# Patient Record
Sex: MALE | Race: WHITE | NOT HISPANIC OR LATINO | Employment: OTHER | URBAN - METROPOLITAN AREA
[De-identification: names, ages, dates, MRNs, and addresses within clinical notes are randomized per-mention and may not be internally consistent; named-entity substitution may affect disease eponyms.]

---

## 2018-06-25 ENCOUNTER — CATARACT CONSULT (OUTPATIENT)
Dept: URBAN - METROPOLITAN AREA CLINIC 39 | Facility: CLINIC | Age: 73
End: 2018-06-25

## 2018-06-25 VITALS
HEART RATE: 51 BPM | RESPIRATION RATE: 18 BRPM | HEIGHT: 60 IN | DIASTOLIC BLOOD PRESSURE: 86 MMHG | SYSTOLIC BLOOD PRESSURE: 141 MMHG

## 2018-06-25 DIAGNOSIS — H35.3131: ICD-10-CM

## 2018-06-25 DIAGNOSIS — H25.811: ICD-10-CM

## 2018-06-25 DIAGNOSIS — H04.123: ICD-10-CM

## 2018-06-25 DIAGNOSIS — H25.812: ICD-10-CM

## 2018-06-25 DIAGNOSIS — H18.51: ICD-10-CM

## 2018-06-25 PROCEDURE — 1036F TOBACCO NON-USER: CPT

## 2018-06-25 PROCEDURE — 92004 COMPRE OPH EXAM NEW PT 1/>: CPT

## 2018-06-25 PROCEDURE — 92136TC INTERFEROMETRY - TECHNICAL COMPONENT

## 2018-06-25 PROCEDURE — G8427 DOCREV CUR MEDS BY ELIG CLIN: HCPCS

## 2018-06-25 PROCEDURE — G8952 PRE-HTN/HTN, NO F/U, NOT GVN: HCPCS

## 2018-06-25 PROCEDURE — 92286 ANT SGM IMG I&R SPECLR MIC: CPT

## 2018-06-25 PROCEDURE — 92134 CPTRZ OPH DX IMG PST SGM RTA: CPT

## 2018-06-25 PROCEDURE — 92025-3 CORNEAL TOPO, REFUSED

## 2018-06-25 PROCEDURE — 4177F TALK PT/CRGVR RE AREDS PREV: CPT

## 2018-06-25 PROCEDURE — 2019F DILATED MACUL EXAM DONE: CPT

## 2018-06-25 ASSESSMENT — VISUAL ACUITY
OD_SC: J4
OS_BAT: 20/70
OS_CC: 20/40
OS_AM: 20/20
OS_SC: J1
OS_CC: J2
OD_CC: 20/20-2
OD_PAM: 20/20
OD_BAT: 20/50
OS_PH: 20/25
OD_CC: J1
OD_SC: 20/25-1

## 2018-06-25 ASSESSMENT — TONOMETRY
OS_IOP_MMHG: 11
OD_IOP_MMHG: 12

## 2020-03-02 ENCOUNTER — CATARACT CONSULT (OUTPATIENT)
Dept: URBAN - METROPOLITAN AREA CLINIC 39 | Facility: CLINIC | Age: 75
End: 2020-03-02

## 2020-03-02 DIAGNOSIS — H18.51: ICD-10-CM

## 2020-03-02 DIAGNOSIS — H04.123: ICD-10-CM

## 2020-03-02 DIAGNOSIS — H25.811: ICD-10-CM

## 2020-03-02 DIAGNOSIS — H25.812: ICD-10-CM

## 2020-03-02 DIAGNOSIS — H35.3131: ICD-10-CM

## 2020-03-02 PROCEDURE — 92015 DETERMINE REFRACTIVE STATE: CPT

## 2020-03-02 PROCEDURE — V2799PMN IMPRIMIS PRED-MOXI-NEPAF 5ML

## 2020-03-02 PROCEDURE — 92286 ANT SGM IMG I&R SPECLR MIC: CPT

## 2020-03-02 PROCEDURE — 92025-2 CORNEAL TOPOGRAPHY, PT

## 2020-03-02 PROCEDURE — 99214 OFFICE O/P EST MOD 30 MIN: CPT

## 2020-03-02 PROCEDURE — 92136TC INTERFEROMETRY - TECHNICAL COMPONENT

## 2020-03-02 PROCEDURE — 92134 CPTRZ OPH DX IMG PST SGM RTA: CPT

## 2020-03-02 ASSESSMENT — VISUAL ACUITY
OD_CC: 20/25
OD_SC: J3
OD_CC: J1
OD_SC: 20/25
OS_CC: 20/30-2
OD_BAT: 20/60
OD_RAM: 20/20
OS_AM: 20/25
OS_BAT: 20/70
OS_SC: J1+
OS_CC: J1+

## 2020-03-02 ASSESSMENT — TONOMETRY
OS_IOP_MMHG: 16
OD_IOP_MMHG: 14

## 2020-12-14 NOTE — PATIENT DISCUSSION
[facility] IOL +20.0 AO60.

## 2020-12-14 NOTE — PATIENT DISCUSSION
D/C Manuel 128 solution and switch to [facility] 128 ointment QID OS only. (use manuel 1218 solution until ointment comes in. ).

## 2020-12-14 NOTE — PATIENT DISCUSSION
Patient has appointment with RETINA doctor tomorrow - question is:  [address] [facility]   If good visual potential, then will plan for DSAEK (partial thickness endothelial corneal transplant) OS.

## 2020-12-14 NOTE — PATIENT DISCUSSION
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 After risks, benefits, and alternatives were discussed, the patient would like to proceed with punctual plugging of bilateral lower puncti.

## 2020-12-14 NOTE — PROCEDURE NOTE: CLINICAL
PROCEDURE NOTE: Punctal Plugs, Silicone #1 Bilateral Lower Lids. Diagnosis: Dry Eye Syndrome. Anesthesia: Topical. Prior to treatment, the risks/benefits/alternatives were discussed. The patient wished to proceed with procedure. Permanent silicone plugs were inserted. Size/location of plugs inserted: 0.5mm. Patient tolerated procedure well. There were no complications. Post procedure instructions given. Wilman Sotelo

## 2021-01-11 NOTE — PATIENT DISCUSSION
Per Retina doctor Dr. Mayela Small [facility]), pt has Ok visual potential OS - ok to proceed with DSAEK OS. R/B/A to transplant discussed with patient and patient elects to proceed.

## 2021-01-11 NOTE — PATIENT DISCUSSION
[facility] IOL +20.0 AO60.

## 2021-01-11 NOTE — PATIENT DISCUSSION
+/- PAV (patient had vitreous loss during outside cataract surgery -- need to be prepared for more vitreous loss during  DSAEK).

## 2021-01-12 NOTE — PATIENT DISCUSSION
Per Retina doctor Dr. Kovacs [facility]), pt has Ok visual potential OS - ok to proceed with DSAEK OS. R/B/A to transplant discussed with patient and patient elects to proceed.

## 2021-01-12 NOTE — PATIENT DISCUSSION
[facility] +20.0 AO60.

## 2021-01-18 NOTE — PATIENT DISCUSSION
[facility] IOL +20.0 AO60.

## 2021-01-18 NOTE — PATIENT DISCUSSION
Per Retina doctor Dr. Sue Dias [facility]), pt has Ok visual potential OS - ok to proceed with DSAEK OS. R/B/A to transplant discussed with patient and patient elects to proceed.

## 2021-02-05 NOTE — PATIENT DISCUSSION
[facility]OL +20.0 AO60.

## 2021-02-05 NOTE — PATIENT DISCUSSION
Per Retina doctor Dr. Moriah Corbett [facility]), pt has Ok visual potential OS - ok to proceed with DSAEK OS. R/B/A to transplant discussed with patient and patient elects to proceed.

## 2021-02-05 NOTE — PATIENT DISCUSSION
Per Retina doctor Dr. Camara Noé [facility]), pt has Ok visual potential OS - ok to proceed with DSAEK OS. R/B/A to transplant discussed with patient and patient elects to proceed.

## 2021-02-05 NOTE — PATIENT DISCUSSION
[facility] IOL +20.0 AO60.

## 2021-02-06 NOTE — PATIENT DISCUSSION
[facility] IOL +20.0 AO60.

## 2021-02-06 NOTE — PATIENT DISCUSSION
Per Retina doctor Dr. Muna Oneill [facility]), pt has Ok visual potential OS - ok to proceed with DSAEK OS. R/B/A to transplant discussed with patient and patient elects to proceed.

## 2021-02-10 NOTE — PATIENT DISCUSSION
[facility] IOL +20.0 AO60.

## 2021-02-10 NOTE — PATIENT DISCUSSION
2/10/21: DSAEK graft 100% attached. Reviewed post op drops with patient. Pt okay to start ilevro. Can also restart manuel ointment qhs. Follow up in 1-2 weeks.

## 2021-02-10 NOTE — PATIENT DISCUSSION
Per Retina doctor Dr. Lilo Haddad [facility]), pt has Ok visual potential OS - ok to proceed with DSAEK OS. R/B/A to transplant discussed with patient and patient elects to proceed.

## 2021-02-10 NOTE — PATIENT DISCUSSION
1DPO: doing well graft attached but prominent d folds.  start post op drops.  continue on-back positioning for 24 hrs.

## 2021-02-22 NOTE — PATIENT DISCUSSION
[facility] IOL +20.0 AO60.

## 2021-02-22 NOTE — PATIENT DISCUSSION
2/22/21: Graft 100% attached.  Edema improving. Suture removed today at slit lamp with no complications. Patient to restart Vigamox QID for 5 days following suture removal. Patient to return in two weeks for post op exam +Naseem +AR.

## 2021-02-22 NOTE — PATIENT DISCUSSION
Per Retina doctor Dr. Lola Williamson [facility]), pt has Ok visual potential OS - ok to proceed with DSAEK OS. R/B/A to transplant discussed with patient and patient elects to proceed.

## 2021-03-01 ENCOUNTER — ESTABLISHED COMPREHENSIVE EXAM (OUTPATIENT)
Dept: URBAN - METROPOLITAN AREA CLINIC 39 | Facility: CLINIC | Age: 76
End: 2021-03-01

## 2021-03-01 DIAGNOSIS — H25.812: ICD-10-CM

## 2021-03-01 DIAGNOSIS — H35.3131: ICD-10-CM

## 2021-03-01 DIAGNOSIS — H25.811: ICD-10-CM

## 2021-03-01 PROCEDURE — 92015 DETERMINE REFRACTIVE STATE: CPT

## 2021-03-01 PROCEDURE — 92134 CPTRZ OPH DX IMG PST SGM RTA: CPT

## 2021-03-01 PROCEDURE — 99214 OFFICE O/P EST MOD 30 MIN: CPT

## 2021-03-01 ASSESSMENT — TONOMETRY
OS_IOP_MMHG: 14
OD_IOP_MMHG: 14

## 2021-03-01 ASSESSMENT — VISUAL ACUITY
OS_AM: 20/20
OS_CC: J12
OD_SC: J6
OD_CC: J2
OD_RAM: 20/20
OD_BAT: 20/60
OS_SC: CF 5FT
OD_CC: 20/30
OD_SC: 20/25-2
OS_CC: 20/400

## 2021-03-11 NOTE — PATIENT DISCUSSION
3/11/21 (1mo post op): Trace edema remains but graft is 100% attached today. Advised patient that vision will continue to improve over the next few months. Continue Lotemax as directed. 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 Can resume YANELIS treatment.  Follow up in 1 month  w/Naseem +AR+MR +Anterior seg OCT.

## 2021-03-11 NOTE — PATIENT DISCUSSION
[facility] pt has Ok visual potential OS - ok to proceed with DSAEK OS. R/B/A to transplant discussed with patient and patient elects to proceed.

## 2021-03-11 NOTE — PATIENT DISCUSSION
[facility] IOL +20.0 AO60.

## 2021-04-12 NOTE — PATIENT DISCUSSION
[facility] IOL +20.0 AO60.

## 2021-04-12 NOTE — PATIENT DISCUSSION
Per Retina doctor Dr. Webb [facility]), pt has Ok visual potential OS - ok to proceed with DSAEK OS. R/B/A to transplant discussed with patient and patient elects to proceed.

## 2021-04-12 NOTE — PATIENT DISCUSSION
4/12/2021: Cornea looks good and edema has improved. Thinnest on Pentacam today is 645 down from 732. Graft is completely attached. However, mild inflammation on graft. Switch lotemax to Pred and increase to BID.

## 2021-07-13 ENCOUNTER — CATARACT EVALUATION (OUTPATIENT)
Dept: URBAN - METROPOLITAN AREA CLINIC 43 | Facility: CLINIC | Age: 76
End: 2021-07-13

## 2021-07-13 DIAGNOSIS — H25.812: ICD-10-CM

## 2021-07-13 DIAGNOSIS — H25.811: ICD-10-CM

## 2021-07-13 DIAGNOSIS — H35.3131: ICD-10-CM

## 2021-07-13 DIAGNOSIS — H43.393: ICD-10-CM

## 2021-07-13 DIAGNOSIS — H04.123: ICD-10-CM

## 2021-07-13 DIAGNOSIS — H18.513: ICD-10-CM

## 2021-07-13 PROCEDURE — 92134 CPTRZ OPH DX IMG PST SGM RTA: CPT

## 2021-07-13 PROCEDURE — 92136TC INTERFEROMETRY - TECHNICAL COMPONENT

## 2021-07-13 PROCEDURE — 99214 OFFICE O/P EST MOD 30 MIN: CPT

## 2021-07-13 PROCEDURE — V2799PMN IMPRIMIS PRED-MOXI-NEPAF 5ML

## 2021-07-13 PROCEDURE — 92025-2 CORNEAL TOPOGRAPHY, PT

## 2021-07-13 PROCEDURE — 92286 ANT SGM IMG I&R SPECLR MIC: CPT

## 2021-07-13 ASSESSMENT — VISUAL ACUITY
OD_RAM: 20/20
OD_CC: 20/30
OS_CC: J3
OD_SC: J4
OD_CC: J3
OS_SC: 20/400
OS_BAT: 20/80
OS_AM: 20/30
OS_SC: J12
OS_CC: 20/400
OD_SC: 20/30
OD_BAT: 20/60

## 2021-07-13 ASSESSMENT — TONOMETRY
OS_IOP_MMHG: 15
OD_IOP_MMHG: 12

## 2021-08-03 ENCOUNTER — SURGERY/PROCEDURE (OUTPATIENT)
Dept: URBAN - METROPOLITAN AREA CLINIC 43 | Facility: CLINIC | Age: 76
End: 2021-08-03

## 2021-08-03 ENCOUNTER — PRE-OP/H&P (OUTPATIENT)
Dept: URBAN - METROPOLITAN AREA CLINIC 39 | Facility: CLINIC | Age: 76
End: 2021-08-03

## 2021-08-03 ENCOUNTER — POST-OP CATARACT (OUTPATIENT)
Dept: URBAN - METROPOLITAN AREA CLINIC 39 | Facility: CLINIC | Age: 76
End: 2021-08-03

## 2021-08-03 DIAGNOSIS — H25.811: ICD-10-CM

## 2021-08-03 DIAGNOSIS — Z96.1: ICD-10-CM

## 2021-08-03 PROCEDURE — 65772LRI LRI DURING CAT SX

## 2021-08-03 PROCEDURE — 66984CV REMOVE CATARACT, INSERT LENS, CUSTOM VISION

## 2021-08-03 PROCEDURE — 99211T TECH SERVICE

## 2021-08-03 PROCEDURE — 66999LNSR LENSAR LASER FOR CAT SX

## 2021-08-03 ASSESSMENT — VISUAL ACUITY: OD_SC: 20/30

## 2021-08-03 ASSESSMENT — TONOMETRY
OD_IOP_MMHG: 18
OS_IOP_MMHG: 18

## 2021-08-10 ENCOUNTER — POST OP/EVAL OF SECOND EYE (OUTPATIENT)
Dept: URBAN - METROPOLITAN AREA CLINIC 39 | Facility: CLINIC | Age: 76
End: 2021-08-10

## 2021-08-10 ASSESSMENT — VISUAL ACUITY
OS_SC: J12
OS_SC: 20/400
OS_AM: 20/30
OD_SC: 20/25+1
OS_BAT: 20/80

## 2021-08-10 ASSESSMENT — TONOMETRY
OS_IOP_MMHG: 17
OD_IOP_MMHG: 17

## 2021-08-17 ENCOUNTER — PRE-OP/H&P (OUTPATIENT)
Dept: URBAN - METROPOLITAN AREA CLINIC 39 | Facility: CLINIC | Age: 76
End: 2021-08-17

## 2021-08-17 ENCOUNTER — POST-OP CATARACT (OUTPATIENT)
Dept: URBAN - METROPOLITAN AREA CLINIC 39 | Facility: CLINIC | Age: 76
End: 2021-08-17

## 2021-08-17 ENCOUNTER — SURGERY/PROCEDURE (OUTPATIENT)
Dept: URBAN - METROPOLITAN AREA CLINIC 43 | Facility: CLINIC | Age: 76
End: 2021-08-17

## 2021-08-17 DIAGNOSIS — H25.812: ICD-10-CM

## 2021-08-17 DIAGNOSIS — Z96.1: ICD-10-CM

## 2021-08-17 PROCEDURE — 99211T TECH SERVICE

## 2021-08-17 PROCEDURE — 66984CV REMOVE CATARACT, INSERT LENS, CUSTOM VISION

## 2021-08-17 ASSESSMENT — TONOMETRY
OD_IOP_MMHG: 19
OS_IOP_MMHG: 21
OD_IOP_MMHG: 19
OS_IOP_MMHG: 24

## 2021-08-17 ASSESSMENT — VISUAL ACUITY
OD_SC: 20/20
OS_SC: 20/200
OS_SC: 20/400
OS_SC: J1 @ 8"
OD_SC: J6-
OD_SC: 20/20

## 2021-11-15 NOTE — PATIENT DISCUSSION
[facility] IOL +20.0 AO60.

## 2021-11-15 NOTE — PATIENT DISCUSSION
Per Retina doctor Dr. Lola Williamson [facility]), pt has Ok visual potential OS - ok to proceed with DSAEK OS. R/B/A to transplant discussed with patient and patient elects to proceed.

## 2022-07-12 ENCOUNTER — SURGERY/PROCEDURE (OUTPATIENT)
Dept: URBAN - METROPOLITAN AREA SURGERY 14 | Facility: SURGERY | Age: 77
End: 2022-07-12

## 2022-07-12 ENCOUNTER — CONSULTATION/EVALUATION (OUTPATIENT)
Dept: URBAN - METROPOLITAN AREA CLINIC 39 | Facility: CLINIC | Age: 77
End: 2022-07-12

## 2022-07-12 DIAGNOSIS — Z96.1: ICD-10-CM

## 2022-07-12 DIAGNOSIS — H26.492: ICD-10-CM

## 2022-07-12 DIAGNOSIS — H26.493: ICD-10-CM

## 2022-07-12 PROCEDURE — 66821 AFTER CATARACT LASER SURGERY: CPT

## 2022-07-12 PROCEDURE — 92014 COMPRE OPH EXAM EST PT 1/>: CPT

## 2022-07-12 ASSESSMENT — VISUAL ACUITY
OD_SC: 20/25+2
OS_SC: 20/400
OD_BAT: 20/50 W/MR
OS_BAT: >20/400 W/MR
OS_SC: J1
OD_SC: J4

## 2022-07-12 ASSESSMENT — TONOMETRY
OD_IOP_MMHG: 17
OS_IOP_MMHG: 17

## 2022-07-21 ENCOUNTER — POST-OP (OUTPATIENT)
Dept: URBAN - METROPOLITAN AREA CLINIC 39 | Facility: CLINIC | Age: 77
End: 2022-07-21

## 2022-07-21 DIAGNOSIS — Z98.890: ICD-10-CM

## 2022-07-21 ASSESSMENT — TONOMETRY
OS_IOP_MMHG: 17
OD_IOP_MMHG: 15

## 2022-07-21 ASSESSMENT — VISUAL ACUITY
OU_SC: 20/25-2
OD_SC: J2
OD_SC: 20/25-1
OS_SC: J1+
OU_SC: J1+
OS_SC: 20/400

## 2022-08-22 ENCOUNTER — POST-OP (OUTPATIENT)
Dept: URBAN - METROPOLITAN AREA CLINIC 39 | Facility: CLINIC | Age: 77
End: 2022-08-22

## 2022-08-22 DIAGNOSIS — Z98.890: ICD-10-CM

## 2022-08-22 PROCEDURE — 99024 POSTOP FOLLOW-UP VISIT: CPT

## 2022-08-22 ASSESSMENT — VISUAL ACUITY
OU_SC: 20/25-1
OS_SC: 20/200
OS_SC: J1+
OD_SC: 20/25-2
OU_SC: J1+
OD_SC: J2

## 2022-08-22 ASSESSMENT — TONOMETRY
OS_IOP_MMHG: 15
OD_IOP_MMHG: 13